# Patient Record
Sex: FEMALE | URBAN - METROPOLITAN AREA
[De-identification: names, ages, dates, MRNs, and addresses within clinical notes are randomized per-mention and may not be internally consistent; named-entity substitution may affect disease eponyms.]

---

## 2018-06-01 ENCOUNTER — OUTPATIENT (OUTPATIENT)
Dept: OUTPATIENT SERVICES | Facility: HOSPITAL | Age: 58
LOS: 1 days | End: 2018-06-01

## 2018-06-22 ENCOUNTER — EMERGENCY (EMERGENCY)
Facility: HOSPITAL | Age: 58
LOS: 1 days | Discharge: HOME | End: 2018-06-22
Attending: EMERGENCY MEDICINE | Admitting: EMERGENCY MEDICINE
Payer: MEDICAID

## 2018-06-22 VITALS
DIASTOLIC BLOOD PRESSURE: 90 MMHG | TEMPERATURE: 98 F | RESPIRATION RATE: 18 BRPM | OXYGEN SATURATION: 96 % | SYSTOLIC BLOOD PRESSURE: 171 MMHG | HEART RATE: 88 BPM

## 2018-06-22 DIAGNOSIS — R41.82 ALTERED MENTAL STATUS, UNSPECIFIED: ICD-10-CM

## 2018-06-22 DIAGNOSIS — F10.129 ALCOHOL ABUSE WITH INTOXICATION, UNSPECIFIED: ICD-10-CM

## 2018-06-22 PROCEDURE — 99284 EMERGENCY DEPT VISIT MOD MDM: CPT

## 2018-06-22 PROCEDURE — 99285 EMERGENCY DEPT VISIT HI MDM: CPT

## 2018-06-22 NOTE — ED ADULT NURSE REASSESSMENT NOTE - NS ED NURSE REASSESS COMMENT FT1
Patient discharged. Shelter called and made aware patient was coming back. Patient walked to private car by RN to go to shelter.

## 2018-06-22 NOTE — ED PROVIDER NOTE - MEDICAL DECISION MAKING DETAILS
59 yo female BIBEMS from the street, intoxicated, unsteady on her feet. No signs of injury or trauma on exam. Pt admits drinking. will observe, await sobriety, re-evaluate, anticipate discharge when steady.

## 2018-06-22 NOTE — ED PROVIDER NOTE - ATTENDING CONTRIBUTION TO CARE
58 yof bibems for intox, admits to EtOH use, no trauma noted.  limited history 2/2 mental status.    agree w/ PA, clinically intoxicated, no trauma noted on exam, protecting airway, will assess for sobriety.

## 2018-06-22 NOTE — ED PROVIDER NOTE - PROGRESS NOTE DETAILS
pt is walking steady, tolerate PO well  and seeking discharge, as she has to be at the shelter on time, so she will not loose her bed.

## 2018-06-22 NOTE — ED PROVIDER NOTE - OBJECTIVE STATEMENT
57 yo female BIBEMS, confused, with unsteady gait. Pt admits drinking a few beers tonight. Reports she lives at the shelter. Denies any injury, denies fall. Pt is poor historian. 59 yo female BIBEMS, confused with slightly slurred speech and  with unsteady gait. Pt admits drinking a few? beers tonight. Reports she lives at the shelter. Denies any injury, denies fall. Pt is poor historian.

## 2018-06-22 NOTE — ED ADULT TRIAGE NOTE - CHIEF COMPLAINT QUOTE
bystander called for pt with unsteady gait at [address] [facility], pt with unsteady gait and pinpoint pupils, denies substance use today

## 2018-06-22 NOTE — ED ADULT NURSE NOTE - CHIEF COMPLAINT QUOTE
bystander called for pt with unsteady gait at [address] [facility], pt with unsteady gait and pinpoint pupils, denies substance use today

## 2018-06-22 NOTE — ED PROVIDER NOTE - SKIN, MLM
Skin normal color for race, warm, dry and intact. No evidence of rash.  no ecchymosis, no abrasions, no lacerations,

## 2018-06-22 NOTE — ED ADULT NURSE NOTE - OBJECTIVE STATEMENT
59 y/o female BIBA after being found stumbling on the street for AMS. Patient denies drug/alcohol use. Denies pain/discomfort. No bruising/deformities noted. Patient forgetful, constantly asking where she is. Pupils pinpoint. Patient placed in yellow gown, belongings secured at nurses station. FS 91 as per EMS.

## 2018-06-25 DIAGNOSIS — R69 ILLNESS, UNSPECIFIED: ICD-10-CM

## 2018-07-01 ENCOUNTER — OUTPATIENT (OUTPATIENT)
Dept: OUTPATIENT SERVICES | Facility: HOSPITAL | Age: 58
LOS: 1 days | End: 2018-07-01

## 2018-07-05 DIAGNOSIS — Z71.89 OTHER SPECIFIED COUNSELING: ICD-10-CM

## 2019-05-06 NOTE — ED PROVIDER NOTE - NS ED MD EM SELECTION
What Type Of Note Output Would You Prefer (Optional)?: Bullet Format How Severe Is Your Skin Lesion?: mild Has Your Skin Lesion Been Treated?: not been treated Is This A New Presentation, Or A Follow-Up?: Skin Lesion 32743 Detailed

## 2024-03-19 NOTE — ED ADULT NURSE NOTE - FALL HARM RISK CONCLUSION
Medication requested: Vitamin D3 50 mcg tablet  Last prescribing provider: Megan Farrell CNP on 12/19/23    Medication requested: Apixaban ANTICOAGULANT 5 mg tablet  Last prescribing provider: Megan Farrell CNP on 12/19/23    Last clinic visit date: 3/15/24 with Dr. Haile    Recommendations for requested medication (if none, N/A): NA    Any other pertinent information (if none, N/A): NA    Refilled: Y/N, if NO, why?    Pended and Routed to Dr. Luis Haile    
Fall Risk